# Patient Record
Sex: FEMALE | Race: AMERICAN INDIAN OR ALASKA NATIVE
[De-identification: names, ages, dates, MRNs, and addresses within clinical notes are randomized per-mention and may not be internally consistent; named-entity substitution may affect disease eponyms.]

---

## 2020-04-25 ENCOUNTER — HOSPITAL ENCOUNTER (EMERGENCY)
Dept: HOSPITAL 5 - ED | Age: 35
Discharge: HOME | End: 2020-04-25
Payer: SELF-PAY

## 2020-04-25 VITALS — DIASTOLIC BLOOD PRESSURE: 69 MMHG | SYSTOLIC BLOOD PRESSURE: 132 MMHG
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
## 2020-04-25 DIAGNOSIS — Z79.899: ICD-10-CM

## 2020-04-25 DIAGNOSIS — J45.909: ICD-10-CM
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
## 2020-04-25 DIAGNOSIS — M84.478A: Primary | ICD-10-CM
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
## 2020-04-25 DIAGNOSIS — Z90.710: ICD-10-CM

## 2020-04-25 PROCEDURE — 99283 EMERGENCY DEPT VISIT LOW MDM: CPT

## 2020-04-25 NOTE — EMERGENCY DEPARTMENT REPORT
ED Lower Extremity HPI





- General


Chief Complaint: Extremity Injury, Lower


Stated Complaint: LEFT FOOT PAIN


Time Seen by Provider: 20 15:49


Source: patient


Mode of arrival: Ambulatory


Limitations: No Limitations





- History of Present Illness


Initial Comments: 





34-year-old -American female presents to the emergency room for 2-day 

history of left foot pain.  Patient states that a heavy object had fallen her 

foot while she was at work on Friday.  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  Patient has a past medical history of asthma.


MD Complaint: foot injury


Onset/Timin


-: days(s)


Injury: Foot: Left


Type of Injury: other (Heavy object falling on foot)


Place: work


Severity scale (0 -10): 10


Improves With: NSAID


Worsens With: weight bearing


Treatments Prior to Arrival: NSAIDS





- Related Data


                                  Previous Rx's











 Medication  Instructions  Recorded  Last Taken  Type


 


ALBUTEROL Inhaler(NF) [VENTOLIN 1 puff IH QID PRN #1 inha 19 Unknown Rx





Inhaler(NF)]    


 


Fluticasone [Flonase] 1 spray NS QDAY #1 bottle 19 Unknown Rx


 


Montelukast [Singulair] 10 mg PO QPM #30 tablet 19 Unknown Rx


 


Prednisone [predniSONE 10 mg 10 mg PO .TAPER #1 tab.ds.pk 19 Unknown Rx





(6-Day Pack, 21 Tabs)]    


 


Cyclobenzaprine [Flexeril] 10 mg PO QHS PRN #10 tablet 19 Unknown Rx


 


HYDROcodone/APAP 7.5-325 [Norco 1 each PO Q8HR PRN #12 tablet 20 Unknown 

Rx





7.5/325]    


 


Ibuprofen [Motrin 800 MG tab] 800 mg PO Q8HR PRN #14 tablet 20 Unknown Rx











                                    Allergies











Allergy/AdvReac Type Severity Reaction Status Date / Time


 


No Known Allergies Allergy   Unverified 19 19:09














ED Review of Systems


ROS: 


Stated complaint: LEFT FOOT PAIN


Other details as noted in HPI








ED Past Medical Hx





- Past Medical History


Previous Medical History?: Yes


Hx Asthma: Yes





- Surgical History


Past Surgical History?: Yes


Additional Surgical History: Hysterectomy





- Social History


Smoking Status: Never Smoker


Substance Use Type: Alcohol





- Medications


Home Medications: 


                                Home Medications











 Medication  Instructions  Recorded  Confirmed  Last Taken  Type


 


ALBUTEROL Inhaler(NF) [VENTOLIN 1 puff IH QID PRN #1 inha 19  Unknown Rx





Inhaler(NF)]     


 


Fluticasone [Flonase] 1 spray NS QDAY #1 bottle 19  Unknown Rx


 


Montelukast [Singulair] 10 mg PO QPM #30 tablet 19  Unknown Rx


 


Prednisone [predniSONE 10 mg 10 mg PO .TAPER #1 tab.ds.pk 19  Unknown Rx





(6-Day Pack, 21 Tabs)]     


 


Cyclobenzaprine [Flexeril] 10 mg PO QHS PRN #10 tablet 19  Unknown Rx


 


HYDROcodone/APAP 7.5-325 [Norco 1 each PO Q8HR PRN #12 tablet 20  Unknown 

Rx





7.5/325]     


 


Ibuprofen [Motrin 800 MG tab] 800 mg PO Q8HR PRN #14 tablet 20  Unknown Rx














ED Physical Exam





- General


Limitations: No Limitations





ED Course





                                   Vital Signs











  20





  15:13


 


Temperature 98.1 F


 


Pulse Rate 88


 


Respiratory 18





Rate 


 


Blood Pressure 132/69


 


O2 Sat by Pulse 99





Oximetry 














ED Lower Extremity MDM





- Radiology Data


Radiology results: report reviewed





Referring Physician:JOMAR THORNEPatient Name:WILBUR GUERRIERPatient

ID:[id number] of Birth:[id number]:FemaleAccession:[id number] 

Date:3319-75-10Iigjsz Status:Finalized


Findings





[facility] 


[address] [place] [address] 





XRay Report 


Signed 





 Patient: WILBUR GUERRIER MR#: 


[id number] 


 : 1985 Acct:[id number] 





 Age/Sex: 34 / F ADM Date: 20 





 Loc: ED 


 Attending Dr: 








 Ordering Physician: ROSSY HERNANDEZ 


 Date of Service: 20 


 Procedure(s): XR foot 2V LT 


 Accession Number(s): [id number] 





 cc: ROSSY HERNANDEZ 





 Fluoro Time In Minutes: 





 LEFT FOOT, 2 VIEWS 





INDICATION / CLINICAL INFORMATION: 


Dropped heavy object on left foot. 





COMPARISON: 


None available. 





FINDINGS: 


Minimally displaced fracture of the proximal phalanx of the fourth toe. 





Signer Name: Romario Bergeron MD 


Signed: 2020 4:08 PM 


Workstation Name: [id number] 








 Transcribed By: [place] 


 Dictated By: Romario Bergeron MD 





- Medical Decision Making





34-year-old -American female presents to the emergency room for 2-day h

istory of left foot pain.  Patient states that a heavy object had fallen her 

foot while she was at work on Friday.  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  Patient has a past medical history of asthma.


Critical care attestation.: 


If time is entered above; I have spent that time in minutes in the direct care 

of this critically ill patient, excluding procedure time.








ED Disposition


Clinical Impression: 


 Toe fracture, left





Disposition: DC- TO HOME OR SELFCARE


Is pt being admited?: No


Does the pt Need Aspirin: No


Condition: Stable


Instructions:  Toe Fracture (ED)


Additional Instructions: 


X-ray shows you have a fracture of your fourth toe on your left foot.  Take pain

medication only as needed and do not have operate heavy machinery while taking 

Norco.  Take ibuprofen every 6-8 hours as needed for pain.  It is very important

for you to follow-up with an orthopedic provider I have listed 1 below for your 

convenience.


Prescriptions: 


Ibuprofen [Motrin 800 MG tab] 800 mg PO Q8HR PRN #14 tablet


 PRN Reason: pain


HYDROcodone/APAP 7.5-325 [[place] 7.5/325] 1 each PO Q8HR PRN #12 tablet


 PRN Reason: Pain


Referrals: 


FIONA HERNÁNDEZ MD [Primary Care Provider] - 3-5 Days


NICO JOHNSON MD [Staff Physician] - 3-5 Days


Forms:  Work/School Release Form(ED)

## 2020-04-25 NOTE — XRAY REPORT
LEFT FOOT, 2 VIEWS



INDICATION / CLINICAL INFORMATION:

Dropped heavy object on left foot.



COMPARISON:

None available.



FINDINGS:

Minimally displaced fracture of the proximal phalanx of the fourth toe.



Signer Name: Romario Bergeron MD 

Signed: 4/25/2020 4:08 PM

Workstation Name: [id number]